# Patient Record
Sex: FEMALE | Race: WHITE
[De-identification: names, ages, dates, MRNs, and addresses within clinical notes are randomized per-mention and may not be internally consistent; named-entity substitution may affect disease eponyms.]

---

## 2017-06-13 ENCOUNTER — HOSPITAL ENCOUNTER (EMERGENCY)
Dept: HOSPITAL 61 - ER | Age: 52
Discharge: HOME | End: 2017-06-13
Payer: MEDICARE

## 2017-06-13 VITALS — HEIGHT: 63 IN | BODY MASS INDEX: 30.12 KG/M2 | WEIGHT: 170 LBS

## 2017-06-13 VITALS — SYSTOLIC BLOOD PRESSURE: 117 MMHG | DIASTOLIC BLOOD PRESSURE: 65 MMHG

## 2017-06-13 DIAGNOSIS — Z98.890: ICD-10-CM

## 2017-06-13 DIAGNOSIS — F90.9: ICD-10-CM

## 2017-06-13 DIAGNOSIS — F43.10: ICD-10-CM

## 2017-06-13 DIAGNOSIS — Z98.84: ICD-10-CM

## 2017-06-13 DIAGNOSIS — Z87.440: ICD-10-CM

## 2017-06-13 DIAGNOSIS — Z88.8: ICD-10-CM

## 2017-06-13 DIAGNOSIS — R10.31: Primary | ICD-10-CM

## 2017-06-13 DIAGNOSIS — R30.0: ICD-10-CM

## 2017-06-13 DIAGNOSIS — G89.29: ICD-10-CM

## 2017-06-13 DIAGNOSIS — Z90.710: ICD-10-CM

## 2017-06-13 DIAGNOSIS — F15.10: ICD-10-CM

## 2017-06-13 DIAGNOSIS — Z88.1: ICD-10-CM

## 2017-06-13 DIAGNOSIS — Z90.49: ICD-10-CM

## 2017-06-13 LAB
ALBUMIN SERPL-MCNC: 4.2 G/DL (ref 3.4–5)
ALBUMIN/GLOB SERPL: 1.1 {RATIO} (ref 1–1.7)
ALP SERPL-CCNC: 138 U/L (ref 46–116)
ALT SERPL-CCNC: 25 U/L (ref 14–59)
ANION GAP SERPL CALC-SCNC: 7 MMOL/L (ref 6–14)
AST SERPL-CCNC: 21 U/L (ref 15–37)
BACTERIA #/AREA URNS HPF: (no result) /HPF
BASOPHILS # BLD AUTO: 0 X10^3/UL (ref 0–0.2)
BASOPHILS NFR BLD: 1 % (ref 0–3)
BILIRUB SERPL-MCNC: 0.2 MG/DL (ref 0.2–1)
BILIRUB UR QL STRIP: NEGATIVE
BUN SERPL-MCNC: 22 MG/DL (ref 7–20)
BUN/CREAT SERPL: 28 (ref 6–20)
CALCIUM SERPL-MCNC: 8.7 MG/DL (ref 8.5–10.1)
CHLORIDE SERPL-SCNC: 103 MMOL/L (ref 98–107)
CO2 SERPL-SCNC: 31 MMOL/L (ref 21–32)
CREAT SERPL-MCNC: 0.8 MG/DL (ref 0.6–1)
EOSINOPHIL NFR BLD: 3 % (ref 0–3)
ERYTHROCYTE [DISTWIDTH] IN BLOOD BY AUTOMATED COUNT: 13.7 % (ref 11.5–14.5)
GFR SERPLBLD BASED ON 1.73 SQ M-ARVRAT: 74.7 ML/MIN
GLOBULIN SER-MCNC: 3.7 G/DL (ref 2.2–3.8)
GLUCOSE SERPL-MCNC: 88 MG/DL (ref 70–99)
GLUCOSE UR STRIP-MCNC: NEGATIVE MG/DL
HCT VFR BLD CALC: 43.8 % (ref 36–47)
HGB BLD-MCNC: 14.6 G/DL (ref 12–15.5)
LYMPHOCYTES # BLD: 1.3 X10^3/UL (ref 1–4.8)
LYMPHOCYTES NFR BLD AUTO: 19 % (ref 24–48)
MAGNESIUM SERPL-MCNC: 2.2 MG/DL (ref 1.8–2.4)
MCH RBC QN AUTO: 29 PG (ref 25–35)
MCHC RBC AUTO-ENTMCNC: 33 G/DL (ref 31–37)
MCV RBC AUTO: 88 FL (ref 79–100)
MONOCYTES NFR BLD: 6 % (ref 0–9)
NEUTROPHILS NFR BLD AUTO: 71 % (ref 31–73)
NITRITE UR QL STRIP: NEGATIVE
PH UR STRIP: 6 [PH]
PLATELET # BLD AUTO: 230 X10^3/UL (ref 140–400)
POTASSIUM SERPL-SCNC: 4 MMOL/L (ref 3.5–5.1)
PROT SERPL-MCNC: 7.9 G/DL (ref 6.4–8.2)
PROT UR STRIP-MCNC: NEGATIVE MG/DL
RBC # BLD AUTO: 4.97 X10^6/UL (ref 3.5–5.4)
RBC #/AREA URNS HPF: 0 /HPF (ref 0–2)
SODIUM SERPL-SCNC: 141 MMOL/L (ref 136–145)
SP GR UR STRIP: 1.01
SQUAMOUS #/AREA URNS LPF: (no result) /LPF
UROBILINOGEN UR-MCNC: 0.2 MG/DL
WBC # BLD AUTO: 6.9 X10^3/UL (ref 4–11)
WBC #/AREA URNS HPF: (no result) /HPF (ref 0–4)

## 2017-06-13 PROCEDURE — 85027 COMPLETE CBC AUTOMATED: CPT

## 2017-06-13 PROCEDURE — 99285 EMERGENCY DEPT VISIT HI MDM: CPT

## 2017-06-13 PROCEDURE — 83735 ASSAY OF MAGNESIUM: CPT

## 2017-06-13 PROCEDURE — 96374 THER/PROPH/DIAG INJ IV PUSH: CPT

## 2017-06-13 PROCEDURE — 80053 COMPREHEN METABOLIC PANEL: CPT

## 2017-06-13 PROCEDURE — 87086 URINE CULTURE/COLONY COUNT: CPT

## 2017-06-13 PROCEDURE — 74177 CT ABD & PELVIS W/CONTRAST: CPT

## 2017-06-13 PROCEDURE — 36415 COLL VENOUS BLD VENIPUNCTURE: CPT

## 2017-06-13 PROCEDURE — 96375 TX/PRO/DX INJ NEW DRUG ADDON: CPT

## 2017-06-13 PROCEDURE — 83690 ASSAY OF LIPASE: CPT

## 2017-06-13 PROCEDURE — 81001 URINALYSIS AUTO W/SCOPE: CPT

## 2017-06-13 NOTE — PHYS DOC
Past Medical History


Past Medical History:  Anxiety, Depression, Hepatitis, UTI


Additional Past Medical Histor:  HEP C+,chronic pain,PTSD() ADHD,


Past Surgical History:  Cholecystectomy, Hysterectomy, Knee Replacement


Additional Past Surgical Histo:  hernia x 2,left knee,planter faciaitis release,

gastric bypass,


Alcohol Use:  None


Drug Use:  Methamphetamine





Adult General


Chief Complaint


Chief Complaint:  FLANK PAIN





HPI


HPI





Patient is a 52  year old female presenting to the emergency department for 

evaluation of dysuria and right lower quadrant abdominal pain.  Patient states 

that pain is mostly burning and sharp in nature and causes her some nausea but 

no vomiting fevers chills vaginal bleeding or discharge.  Patient admits that 

she has chronic pain disorder and takes oxycodone for pain.





Review of Systems


Review of Systems





Constitutional: Denies fever or chills []


Eyes: Denies change in visual acuity, redness, or eye pain []


HENT: Denies nasal congestion or sore throat []


Respiratory: Denies cough or shortness of breath []


Cardiovascular: No additional information not addressed in HPI []


GI: + abdominal pain, nausea.  No vomiting, bloody stools or diarrhea []


:+ dysuria.  No hematuria []


Musculoskeletal: Denies back pain or joint pain []


Integument: Denies rash or skin lesions []


Neurologic: Denies headache, focal weakness or sensory changes []





Current Medications


Current Medications





Current Medications








 Medications


  (Trade)  Dose


 Ordered  Sig/Krishna  Start Time


 Stop Time Status Last Admin


Dose Admin


 


 Info


  (Do NOT chart on


 this entry -- for


 MONITORING)  1 each  PRN DAILY  PRN  6/13/17 19:15


 6/15/17 19:14   


 


 


 Iohexol


  (Omnipaque 300


 Mg/ml)  75 ml  1X  ONCE  6/13/17 19:00


 6/13/17 19:02 DC 6/13/17 19:14


75 ML


 


 Ketorolac


 Tromethamine


  (Toradol)  30 mg  1X  ONCE  6/13/17 17:30


 6/13/17 17:31 DC 6/13/17 17:46


30 MG


 


 Ondansetron HCl


  (Zofran)  8 mg  1X  ONCE  6/13/17 17:30


 6/13/17 17:31 DC 6/13/17 17:46


8 MG











Allergies


Allergies





Allergies








Coded Allergies Type Severity Reaction Last Updated Verified


 


  varenicline Allergy Intermediate "i just get sick" 5/6/16 No


 


  promethazine Allergy Mild "my skin is crawling" 5/6/16 No











Physical Exam


Physical Exam





Constitutional: Well developed, well nourished, no acute distress, non-toxic 

appearance. []


HENT: Normocephalic, atraumatic, bilateral external ears normal, oropharynx 

moist, no oral exudates, nose normal. []


Eyes: PERRLA, EOMI, conjunctiva normal, no discharge. [] 


Neck: Normal range of motion, no tenderness, supple, no stridor. [] 


Cardiovascular:Heart rate regular rhythm, no murmur []


Lungs & Thorax:  Bilateral breath sounds clear to auscultation []


Abdomen: Bowel sounds normal, soft, mild RLQ tenderness, no rebound or guarding

, no masses, no pulsatile masses. [] 


Skin: Warm, dry, no erythema, no rash. [] 


Back: No tenderness, no CVA tenderness. [] 


Extremities: No tenderness, no cyanosis, no clubbing, ROM intact, no edema. [] 


Neurologic: Alert and oriented X 3, normal motor function, normal sensory 

function, no focal deficits noted. []


Psychologic: Affect normal, judgement normal, mood normal. []





Current Patient Data


Vital Signs





 Vital Signs








  Date Time  Temp Pulse Resp B/P (MAP) Pulse Ox O2 Delivery O2 Flow Rate FiO2


 


6/13/17 18:33  83 18 117/65 (82)  Room Air 96.0 


 


6/13/17 17:10 97.0    98   





 97.0       








Lab Values





 Laboratory Tests








Test


  6/13/17


17:30


 


White Blood Count


  6.9 x10^3/uL


(4.0-11.0)


 


Red Blood Count


  4.97 x10^6/uL


(3.50-5.40)


 


Hemoglobin


  14.6 g/dL


(12.0-15.5)


 


Hematocrit


  43.8 %


(36.0-47.0)


 


Mean Corpuscular Volume


  88 fL ()


 


 


Mean Corpuscular Hemoglobin 29 pg (25-35)  


 


Mean Corpuscular Hemoglobin


Concent 33 g/dL


(31-37)


 


Red Cell Distribution Width


  13.7 %


(11.5-14.5)


 


Platelet Count


  230 x10^3/uL


(140-400)


 


Neutrophils (%) (Auto) 71 % (31-73)  


 


Lymphocytes (%) (Auto) 19 % (24-48)  L


 


Monocytes (%) (Auto) 6 % (0-9)  


 


Eosinophils (%) (Auto) 3 % (0-3)  


 


Basophils (%) (Auto) 1 % (0-3)  


 


Neutrophils # (Auto)


  4.9 x10^3uL


(1.8-7.7)


 


Lymphocytes # (Auto)


  1.3 x10^3/uL


(1.0-4.8)


 


Monocytes # (Auto)


  0.4 x10^3/uL


(0.0-1.1)


 


Eosinophils # (Auto)


  0.2 x10^3/uL


(0.0-0.7)


 


Basophils # (Auto)


  0.0 x10^3/uL


(0.0-0.2)


 


Urine Collection Type Unknown  


 


Urine Color Yellow  


 


Urine Clarity Clear  


 


Urine pH 6.0  


 


Urine Specific Gravity 1.010  


 


Urine Protein


  Negative mg/dL


(NEG-TRACE)


 


Urine Glucose (UA)


  Negative mg/dL


(NEG)


 


Urine Ketones (Stick)


  Negative mg/dL


(NEG)


 


Urine Blood


  Negative (NEG)


 


 


Urine Nitrite


  Negative (NEG)


 


 


Urine Bilirubin


  Negative (NEG)


 


 


Urine Urobilinogen Dipstick


  0.2 mg/dL (0.2


mg/dL)


 


Urine Leukocyte Esterase Small (NEG)  


 


Urine RBC 0 /HPF (0-2)  


 


Urine WBC


  Occ /HPF (0-4)


 


 


Urine Squamous Epithelial


Cells Few /LPF  


 


 


Urine Bacteria


  Few /HPF


(0-FEW)


 


Sodium Level


  141 mmol/L


(136-145)


 


Potassium Level


  4.0 mmol/L


(3.5-5.1)


 


Chloride Level


  103 mmol/L


()


 


Carbon Dioxide Level


  31 mmol/L


(21-32)


 


Anion Gap 7 (6-14)  


 


Blood Urea Nitrogen


  22 mg/dL


(7-20)  H


 


Creatinine


  0.8 mg/dL


(0.6-1.0)


 


Estimated GFR


(Cockcroft-Gault) 74.7  


 


 


BUN/Creatinine Ratio 28 (6-20)  H


 


Glucose Level


  88 mg/dL


(70-99)


 


Calcium Level


  8.7 mg/dL


(8.5-10.1)


 


Magnesium Level


  2.2 mg/dL


(1.8-2.4)


 


Total Bilirubin


  0.2 mg/dL


(0.2-1.0)


 


Aspartate Amino Transferase


(AST) 21 U/L (15-37)


 


 


Alanine Aminotransferase (ALT)


  25 U/L (14-59)


 


 


Alkaline Phosphatase


  138 U/L


()  H


 


Total Protein


  7.9 g/dL


(6.4-8.2)


 


Albumin


  4.2 g/dL


(3.4-5.0)


 


Albumin/Globulin Ratio 1.1 (1.0-1.7)  


 


Lipase


  290 U/L


()





 Laboratory Tests


6/13/17 17:30








 Laboratory Tests


6/13/17 17:30














EKG


EKG


[]





Radiology/Procedures


Radiology/Procedures


CT Abdomen and Pelvis With Intravenous Contrast:


 


History: Right lower quadrant abdominal pain.


 


Comparison: None.


 


Technique: After administration of intravenous contrast, 75 mL of 


Omnipaque 300, CT of the abdomen and pelvis was performed. 


 


Exposure: One or more of the following individualized dose reduction 


techniques were utilized for this examination:  1. Automated exposure 


control  2. Adjustment of the mA and/or kV according to patient size  3. 


Use of iterative reconstruction technique


 


Findings:


 


Evaluation enteric structures may be limited by lack of oral contrast.


 


Liver, spleen, pancreas, and bilateral adrenal glands are unremarkable. 


Bilateral kidneys enhance symmetrically. Bilateral renal cysts are 


present. No bowel obstruction or inflammation is seen. Postsurgical 


changes of gastric bypass are seen. Appendix is without evidence of 


inflammation. Urinary bladder is unremarkable. Uterus is absent. No free 


air free fluid is seen in the abdomen or pelvis.


 


Degenerative changes are present in the spine.


 


Impression:


No acute abnormality identified in the abdomen or pelvis.


 


Electronically signed by: Gasper Magallanes MD (6/13/2017 7:51 PM)














DICTATED and SIGNED BY:     GASPER MAGALLANES MD


DATE:     06/13/17 1947





Course & Med Decision Making


Course & Med Decision Making


Patient keeps complaining of burning across her entire lower abdomen but her 

workup is completely negative.  Her pain is not severe and she has no pain on 

repeat abdominal exam so she'll be discharged in stable condition with 

instructions to follow with her primary care provider and GI physician and come 

back to the ER sooner with any worsening pain fevers vomiting or other general 

concerns.





Dragon Disclaimer


Dragon Disclaimer


This electronic medical record was generated, in whole or in part, using a 

voice recognition dictation system.





Departure


Departure


Impression:  


 Primary Impression:  


 Abdominal pain


Disposition:  01 HOME, SELF-CARE


Condition:  GOOD


Referrals:  


RENY JEAN MD (PCP)


Patient Instructions:  Abdominal Pain





Problem Qualifiers








 Primary Impression:  


 Abdominal pain


 Abdominal location:  lower abdomen, unspecified  Qualified Codes:  R10.30 - 

Lower abdominal pain, unspecified








SUSIE BULL DO Jun 13, 2017 19:57

## 2018-06-22 ENCOUNTER — HOSPITAL ENCOUNTER (EMERGENCY)
Dept: HOSPITAL 61 - ER | Age: 53
Discharge: HOME | End: 2018-06-22
Payer: MEDICARE

## 2018-06-22 DIAGNOSIS — R11.0: ICD-10-CM

## 2018-06-22 DIAGNOSIS — Z90.49: ICD-10-CM

## 2018-06-22 DIAGNOSIS — G89.29: ICD-10-CM

## 2018-06-22 DIAGNOSIS — E03.9: ICD-10-CM

## 2018-06-22 DIAGNOSIS — R14.0: ICD-10-CM

## 2018-06-22 DIAGNOSIS — Z88.8: ICD-10-CM

## 2018-06-22 DIAGNOSIS — F32.9: ICD-10-CM

## 2018-06-22 DIAGNOSIS — Z96.652: ICD-10-CM

## 2018-06-22 DIAGNOSIS — F41.9: ICD-10-CM

## 2018-06-22 DIAGNOSIS — F90.9: ICD-10-CM

## 2018-06-22 DIAGNOSIS — Z90.710: ICD-10-CM

## 2018-06-22 DIAGNOSIS — R10.31: Primary | ICD-10-CM

## 2018-06-22 LAB
ADD MAN DIFF?: NO
ALBUMIN SERPL-MCNC: 3.9 G/DL (ref 3.4–5)
ALBUMIN/GLOB SERPL: 1 {RATIO} (ref 1–1.7)
ALP SERPL-CCNC: 127 U/L (ref 46–116)
ALT (SGPT): 25 U/L (ref 14–59)
ANION GAP SERPL CALC-SCNC: 5 MMOL/L (ref 6–14)
AST SERPL-CCNC: 20 U/L (ref 15–37)
BACTERIA,URINE: (no result) /HPF
BASO #: 0.1 X10^3/UL (ref 0–0.2)
BASO %: 1 % (ref 0–3)
BILIRUBIN,URINE: (no result)
BLOOD UREA NITROGEN: 20 MG/DL (ref 7–20)
BUN/CREAT SERPL: 25 (ref 6–20)
CALCIUM: 8.6 MG/DL (ref 8.5–10.1)
CHLORIDE: 106 MMOL/L (ref 98–107)
CLARITY,URINE: CLEAR
CO2 SERPL-SCNC: 32 MMOL/L (ref 21–32)
COLOR,URINE: YELLOW
CREAT SERPL-MCNC: 0.8 MG/DL (ref 0.6–1)
EOS #: 0.2 X10^3/UL (ref 0–0.7)
EOS %: 2 % (ref 0–3)
GFR SERPLBLD BASED ON 1.73 SQ M-ARVRAT: 74.5 ML/MIN
GLOBULIN SER-MCNC: 3.8 G/DL (ref 2.2–3.8)
GLUCOSE SERPL-MCNC: 94 MG/DL (ref 70–99)
GLUCOSE,URINE: NEGATIVE MG/DL
HCG SERPL-ACNC: 8 X10^3/UL (ref 4–11)
HEMATOCRIT: 41.2 % (ref 36–47)
HEMOGLOBIN: 13.5 G/DL (ref 12–15.5)
HYALINE CASTS, URINE: (no result) /HPF
LIPASE: 116 U/L (ref 73–393)
LYMPH #: 1.5 X10^3/UL (ref 1–4.8)
LYMPH %: 19 % (ref 24–48)
MEAN CORPUSCULAR HEMOGLOBIN: 26 PG (ref 25–35)
MEAN CORPUSCULAR HGB CONC: 33 G/DL (ref 31–37)
MEAN CORPUSCULAR VOLUME: 81 FL (ref 79–100)
MONO #: 0.6 X10^3/UL (ref 0–1.1)
MONO %: 7 % (ref 0–9)
NEUT #: 5.7 X10^3UL (ref 1.8–7.7)
NEUT %: 71 % (ref 31–73)
NITRITE,URINE: NEGATIVE
PH,URINE: 6
PLATELET COUNT: 287 X10^3/UL (ref 140–400)
POTASSIUM SERPL-SCNC: 3.9 MMOL/L (ref 3.5–5.1)
PROTEIN,URINE: NEGATIVE MG/DL
RBC,URINE: (no result) /HPF (ref 0–2)
RED BLOOD COUNT: 5.1 X10^6/UL (ref 3.5–5.4)
RED CELL DISTRIBUTION WIDTH: 15.6 % (ref 11.5–14.5)
SODIUM: 143 MMOL/L (ref 136–145)
SPECIFIC GRAVITY,URINE: >=1.03
SQUAMOUS EPITHELIAL CELL,UR: (no result) /LPF
TOTAL BILIRUBIN: 0.3 MG/DL (ref 0.2–1)
TOTAL PROTEIN: 7.7 G/DL (ref 6.4–8.2)
UROBILINOGEN,URINE: 0.2 MG/DL
WBC,URINE: (no result) /HPF (ref 0–4)

## 2018-06-22 PROCEDURE — 83690 ASSAY OF LIPASE: CPT

## 2018-06-22 PROCEDURE — 74176 CT ABD & PELVIS W/O CONTRAST: CPT

## 2018-06-22 PROCEDURE — 96375 TX/PRO/DX INJ NEW DRUG ADDON: CPT

## 2018-06-22 PROCEDURE — 99285 EMERGENCY DEPT VISIT HI MDM: CPT

## 2018-06-22 PROCEDURE — 85025 COMPLETE CBC W/AUTO DIFF WBC: CPT

## 2018-06-22 PROCEDURE — 81001 URINALYSIS AUTO W/SCOPE: CPT

## 2018-06-22 PROCEDURE — 96374 THER/PROPH/DIAG INJ IV PUSH: CPT

## 2018-06-22 PROCEDURE — 36415 COLL VENOUS BLD VENIPUNCTURE: CPT

## 2018-06-22 PROCEDURE — 80053 COMPREHEN METABOLIC PANEL: CPT

## 2018-06-22 RX ADMIN — MORPHINE SULFATE 1 MG: 4 INJECTION, SOLUTION INTRAMUSCULAR; INTRAVENOUS at 17:15

## 2018-06-22 RX ADMIN — BACITRACIN 1 MLS/HR: 5000 INJECTION, POWDER, FOR SOLUTION INTRAMUSCULAR at 17:02

## 2018-06-22 RX ADMIN — ONDANSETRON 1 MG: 2 INJECTION INTRAMUSCULAR; INTRAVENOUS at 17:02

## 2018-06-22 RX ADMIN — MORPHINE SULFATE 1 MG: 4 INJECTION, SOLUTION INTRAMUSCULAR; INTRAVENOUS at 17:02

## 2018-06-22 RX ADMIN — MORPHINE SULFATE 1 MG: 4 INJECTION, SOLUTION INTRAMUSCULAR; INTRAVENOUS at 16:45

## 2021-07-18 NOTE — RAD
XR FOREARM_LEFT 2 VIEWS



DATE:  7/18/2021 2:26 AM



INDICATION:  Reason: f/o fb / Spl. Instructions:  / History:   



COMPARISON:  None.



FINDINGS: 



Bones:  There is no evidence of acute fracture. No joint dislocation is noted. 



Miscellaneous: No radiopaque foreign body.



IMPRESSION:  



No acute fracture or radiopaque foreign body.



Electronically signed by: Francisco Romo MD (7/18/2021 3:47 AM) [facility]

## 2021-07-18 NOTE — PHYS DOC
Past Medical History


Past Medical History:  Anxiety, Constipation, Depression, Hypothyroid, Hep

atitis, UTI


Additional Past Medical Histor:  HEP C+,chronic pain,PTSD(), ADHD, 

gastric bypass, IV Drug use


Past Surgical History:  Cholecystectomy, Hysterectomy, Knee Replacement


Additional Past Surgical Histo:  hernia x 2,left knee,planter faciaitis 

release,gastric bypass,


Smoking Status:  Former Smoker


Alcohol Use:  None


Drug Use:  Methamphetamine





General Adult


EDM:


Chief Complaint:  ABSCESS





HPI:


HPI:


56-year-old female presents the ED with complaints of red, painful swollen 

lesion over her left forearm after using IV methamphetamine for the past week, 

unsure last tetanus.  No known history of MRSA.  No history of immunocompromised

state or steroids.  Is crying stating "It's so stupid I did this to myself."





Review of Systems:


Review of Systems:


Constitutional:   Denies fever or chills. []


Eyes:   Denies change in visual acuity. []


HENT:   Denies nasal congestion or sore throat. [] 


Respiratory:   Denies cough or shortness of breath. [] 


Cardiovascular:   Denies chest pain or edema. [] 


GI:   Denies  nausea, vomiting, 


Musculoskeletal:   Denies joint pain or swelling


Integument:   Denies scars or desquamation


Neurologic:   Denies  focal weakness or sensory changes. [] 


Psychiatric:  Denies depression or anxiety, denies suicidal homicidal ideations





Heart Score:


C/O Chest Pain:  No


Risk Factors:


Risk Factors:  DM, Current or recent (<one month) smoker, HTN, HLP, family 

history of CAD, obesity.


Risk Scores:


Score 0 - 3:  2.5% MACE over next 6 weeks - Discharge Home


Score 4 - 6:  20.3% MACE over next 6 weeks - Admit for Clinical Observation


Score 7 - 10:  72.7% MACE over next 6 weeks - Early Invasive Strategies





Current Medications:





Current Medications








 Medications


  (Trade)  Dose


 Ordered  Sig/Krishna  Start Time


 Stop Time Status Last Admin


Dose Admin


 


 Diphtheria/


 Tetanus/Acell


 Pertussis


  (ADACEL TDap


 SYRINGE)  0.5 ml  ONCE ONCE  7/18/21 03:00


 7/18/21 03:01 DC 7/18/21 02:55


0.5 ML


 


 Lidocaine HCl


  (Lidocaine 1%


 20ml Vial)  20 ml  1X  ONCE  7/18/21 03:00


 7/18/21 03:01 DC 7/18/21 02:52


20 ML











Allergies:


Allergies:





Allergies








Coded Allergies Type Severity Reaction Last Updated Verified


 


  varenicline Allergy Intermediate "i just get sick" 5/6/16 No


 


  promethazine Allergy Mild "my skin is crawling" 5/6/16 No











Physical Exam:


PE:





Constitutional: Well developed, well nourished, no acute distress, non-toxic 

appearance. 


HENT: Normocephalic, atraumatic,


Eyes: EOMI, conjunctiva normal, no discharge.  


Neck: Normal range of motion,  supple, 


Cardiovascular: S1/2 present, regular rhythm


Lungs & Thorax: Speaking in full sentences, bilateral equal chest rise, no 

tachypnea or increased work of breathing


Skin: Warm, dry, 6 x 6 cm erythema over mid forearm (ventral) with 4 x 4 

centimeter skin elevation with pulsatile fluctuance at the apex-painful and 

tender to the touch


Extremities: no cyanosis, no joint swelling, equal radial pulses


Neurologic: Alert and oriented X 3, normal motor function, normal sensory 

function, no focal deficits noted. []


Psychologic: Affect normal, judgement normal-talkative, mood normal-emotional 

when talking about her ex- who overdose





Current Patient Data:


Vital Signs:





                                   Vital Signs








  Date Time  Temp Pulse Resp B/P (MAP) Pulse Ox O2 Delivery O2 Flow Rate FiO2


 


7/18/21 00:45 98.7 110 18 125/90 (84) 96 Room Air  





 98.7       











EKG:


EKG:


[]





Radiology/Procedures:


Radiology/Procedures:


Indication: abscess





Procedure: The patient was positioned appropriately. Local anesthesia was 1% 

lidocaine.  An incision was then made over the apex of the lesion and 5 to 10 cc

 material was expressed. The drainage cavity was irrigated and packed with 

sterile gauze. The patients tetanus status updated as needed. 





The patient tolerated the procedure well.





Complications: none.





Forearm x-ray performed-no foreign body visualized by myself





Course & Med Decision Making:


Course & Med Decision Making


Pertinent Labs and Imaging studies reviewed. (See chart for details)





Concern for left forearm abscess with cellulitis, status post I&D.  Will 

prescribe antibiotics and wound check in 2 to 3 days for repacking.  Patient 

afebrile, well-appearing.  Tolerated I&D.  Will discharge home with strict ED 

return precautions were given for worsening rash, pain, or neurologic deficits. 

Encouraged urgent outpatient follow-up with PMD in 2 to 3 days for wound check, 

wound care center information given.  Life-threatening processes were considered

 but are low suspicion at this time, given history, physical exam and ED workup.

 Pt was educated on all prescription medications and adverse effects.  All 

patient's questions were answered and pt was stable at time of discharge.





Life/limb-threatening differential includes but is not limited to, trauma 

(fracture, dislocation, laceration, compartment syndrome, tendon or ligament 

injury), neurovascular injury or deficitcva/tia, infection (osteomyelitis, 

abscess, cellulitis, septic arthritis, necrotizing fasciitis), deep vein 

thrombosis, renal/cardiac/liver disease, medication adverse effect, lymphedema

/anasarca, vascular insufficiency or malignancy, 





I have spoken with the patient and/or caregivers.  I explained the patient's 

condition, diagnoses and treatment plan based on the information available to me

 at this time.  I have answered the patient and/or caregiver's questions and 

addressed any concerns.  The patient and/or caregivers have a good understanding

 of patient's diagnosis, condition and treatment plan as can be expected at this

 point.  Vital signs have been stable.  Patient's condition is stable and 

appropriate for discharge from the emergency department. 





Patient will pursue further outpatient evaluation with primary care physician or

 other designated or consulting physician as outlined in the discharge 

instructions.  The patient and/or caregivers are agreeable to this plan of care 

and follow-up instructions have been explained in detail.  The patient and/or 

caregivers have received these instructions in written form and have expressed 

an understanding of the discharge instructions.  The patient and/or caregivers 

are aware that any significant change of condition or worsening of symptoms 

should prompt immediate return to this or the closest emergency department or 

call to 911.





Amanuel Disclaimer:


Dragon Disclaimer:


This electronic medical record was generated, in whole or in part, using a voice

 recognition dictation system.





Departure


Departure


Impression:  


   Primary Impression:  


   Abscess of left forearm


   Additional Impressions:  


   Cellulitis of left forearm


   Need for Tdap vaccination


Disposition:  01 HOME / SELF CARE / HOMELESS


Condition:  STABLE


Referrals:  


UNKNOWN PCP NAME (PCP)


Follow-up with your primary care physician in 48 to 72 hours OR


FOLLOW UP WITH FAMILY MEDICINE:


[address], [address]


[place] [address]


Phone: [phone]


Patient Instructions:  Abscess, Cellulitis, VIS, Tetanus, Diphtheria (Td); 

Tetanus, Diphtheria, Pertussis (Tdap) - CDC





Additional Instructions:  


FOLLOW UP WITH WOUND CARE:  FOR DEFINITIVE MANAGEMENT - NEEDS WOUND CHECK IN 2-3

 DAYS FOR REPACKING


[facility]


Wound Care Center


[address], [address]


[place] [address]


Phone: [phone]


Scripts


Cephalexin (CEPHALEXIN) 500 Mg Capsule


1 CAP PO QID for 7 Days, #28 CAP


   Prov: SHIN MCKOY DO         7/18/21 


Sulfamethoxazole/Trimethoprim (BACTRIM DS TABLET) 1 Each Tablet


1 TAB PO BID for infection for 7 Days, #14 TAB


   Prov: SHIN MCKOY DO         7/18/21











SHIN MCKOY DO               Jul 18, 2021 03:17

## 2022-03-12 NOTE — RAD
CT Abdomen and Pelvis With Intravenous Contrast:

 

History: Right lower quadrant abdominal pain.

 

Comparison: None.

 

Technique: After administration of intravenous contrast, 75 mL of 

Omnipaque 300, CT of the abdomen and pelvis was performed. 

 

Exposure: One or more of the following individualized dose reduction 

techniques were utilized for this examination:  1. Automated exposure 

control  2. Adjustment of the mA and/or kV according to patient size  3. 

Use of iterative reconstruction technique

 

Findings:

 

Evaluation enteric structures may be limited by lack of oral contrast.

 

Liver, spleen, pancreas, and bilateral adrenal glands are unremarkable. 

Bilateral kidneys enhance symmetrically. Bilateral renal cysts are 

present. No bowel obstruction or inflammation is seen. Postsurgical 

changes of gastric bypass are seen. Appendix is without evidence of 

inflammation. Urinary bladder is unremarkable. Uterus is absent. No free 

air free fluid is seen in the abdomen or pelvis.

 

Degenerative changes are present in the spine.

 

Impression:

No acute abnormality identified in the abdomen or pelvis.

 

Electronically signed by: Gasper Magallanes MD (6/13/2017 7:51 PM)
,[email or web address],DirectAddress_Unknown,[email or web address],DirectAddress_Unknown